# Patient Record
Sex: MALE | Race: WHITE | NOT HISPANIC OR LATINO | Employment: FULL TIME | URBAN - METROPOLITAN AREA
[De-identification: names, ages, dates, MRNs, and addresses within clinical notes are randomized per-mention and may not be internally consistent; named-entity substitution may affect disease eponyms.]

---

## 2024-01-09 ENCOUNTER — OFFICE VISIT (OUTPATIENT)
Dept: URGENT CARE | Facility: CLINIC | Age: 44
End: 2024-01-09
Payer: COMMERCIAL

## 2024-01-09 ENCOUNTER — APPOINTMENT (OUTPATIENT)
Dept: URGENT CARE | Facility: CLINIC | Age: 44
End: 2024-01-09

## 2024-01-09 VITALS
HEART RATE: 88 BPM | RESPIRATION RATE: 16 BRPM | TEMPERATURE: 96.9 F | WEIGHT: 175 LBS | DIASTOLIC BLOOD PRESSURE: 80 MMHG | HEIGHT: 70 IN | BODY MASS INDEX: 25.05 KG/M2 | SYSTOLIC BLOOD PRESSURE: 120 MMHG | OXYGEN SATURATION: 97 %

## 2024-01-09 DIAGNOSIS — J02.9 VIRAL PHARYNGITIS: ICD-10-CM

## 2024-01-09 LAB — S PYO DNA SPEC NAA+PROBE: NOT DETECTED

## 2024-01-09 PROCEDURE — 3079F DIAST BP 80-89 MM HG: CPT

## 2024-01-09 PROCEDURE — 3074F SYST BP LT 130 MM HG: CPT

## 2024-01-09 PROCEDURE — 87651 STREP A DNA AMP PROBE: CPT

## 2024-01-09 PROCEDURE — 99203 OFFICE O/P NEW LOW 30 MIN: CPT

## 2024-01-09 NOTE — PATIENT INSTRUCTIONS
You have a sore throat.  Your strep testing is pending and will be available in about 1 hour  My advise is:  Do frequent Warm salt water gargles  Alternate tylenol and ibuprofen for pain  Dry up your nasal congestion to prevent post nasal drip with antihistamines like zyrtec and a nasal decongestant like Sudafed (Do not take if you have high blood pressure)  Soft foods and cold ice pops or warm liquids, such as broth, tea with honey and lemon, or warm soup  Throat lozenges/or spray as directed    If your strep is positive, I will send in antibiotics and you can return to work/school in 24 hours starting.  Throw out your toothbrush 2-3 days after starting antibiotics    I hope you feel better soon   SCAR Roman

## 2024-01-09 NOTE — PROGRESS NOTES
"Chief Complaint   Patient presents with    Pharyngitis     X 3 days         Subjective:   HISTORY OF PRESENT ILLNESS: Jarad Hahn is a 43 y.o. male who presents for sore throat x 3 days, mild cough.  He did have URI symptoms about 2 weeks ago, most had resolved but woke 3 days ago and sore throat worsened.    Patient denies recent fevers.      Medications, Allergies, current problem list, Social and Family history reviewed today in Epic.     Objective:     /80   Pulse 88   Temp 36.1 °C (96.9 °F) (Temporal)   Resp 16   Ht 1.778 m (5' 10\")   Wt 79.4 kg (175 lb)   SpO2 97%     Physical Exam  Vitals reviewed.   Constitutional:       Appearance: Normal appearance. He is not toxic-appearing.   HENT:      Head:      Jaw: No trismus.      Right Ear: Tympanic membrane normal.      Left Ear: Tympanic membrane normal.      Nose: Rhinorrhea present.      Mouth/Throat:      Mouth: Mucous membranes are moist.      Pharynx: Uvula midline. Posterior oropharyngeal erythema present. No pharyngeal swelling, oropharyngeal exudate or uvula swelling.      Tonsils: No tonsillar exudate or tonsillar abscesses. 1+ on the right. 1+ on the left.      Comments: No muffled voice, trismus, unilateral deviation of the uvula, soft palate fullness or edema. No oral airway compromise, or drooling noted.   Eyes:      Conjunctiva/sclera: Conjunctivae normal.   Cardiovascular:      Rate and Rhythm: Normal rate and regular rhythm.      Heart sounds: Normal heart sounds.   Pulmonary:      Effort: Pulmonary effort is normal. No respiratory distress.      Breath sounds: Normal breath sounds. No decreased breath sounds or wheezing.   Musculoskeletal:      Cervical back: Full passive range of motion without pain and neck supple.   Skin:     General: Skin is warm and dry.   Neurological:      Mental Status: He is alert and oriented to person, place, and time.   Psychiatric:         Mood and Affect: Mood normal.          Assessment/Plan: "     Diagnosis and associated orders    I personally reviewed prior external notes and test results pertinent to today's visit.     1. Viral pharyngitis  POCT CEPHEID GROUP A STREP - PCR        Results for orders placed or performed in visit on 01/09/24   POCT CEPHEID GROUP A STREP - PCR   Result Value Ref Range    POC Group A Strep, PCR Not Detected Not Detected, Invalid         IMPRESSION:  Pt has stable vital signs and no red flag symptoms identified. Exam shows mild pharyngeal edema without exudates..   Informed pt that symptoms are consistent with a viral illness and are self limiting.  No antibiotics are indicated at this time.  Advised to use salt water gargles.  Ibuprofen and Tylenol for pain.  Use nasal decongestants to resolve his postnasal drip which is most likely causing his sore throat.    Differential diagnosis discussed. Pt was Educated on red flag symptoms. Pt has been Instructed to return to Urgent Care or nearest Emergency Department if symptoms fail to improve, for any change in condition, further concerns, or new concerning symptoms. Patient states understanding of the plan of care and discharge instructions.  They are discharged in stable condition.         Please note that this dictation was created using voice recognition software. I have made a reasonable attempt to correct obvious errors, but I expect that there are errors of grammar and possibly content that I did not discover before finalizing the note.    This note was electronically signed by KRISTIAN Wilson

## 2024-02-16 ENCOUNTER — HOSPITAL ENCOUNTER (OUTPATIENT)
Dept: LAB | Facility: MEDICAL CENTER | Age: 44
End: 2024-02-16
Attending: PHYSICAL MEDICINE & REHABILITATION
Payer: COMMERCIAL

## 2024-02-16 LAB
25(OH)D3 SERPL-MCNC: 35 NG/ML (ref 30–100)
ALBUMIN SERPL BCP-MCNC: 4.7 G/DL (ref 3.2–4.9)
ALBUMIN/GLOB SERPL: 1.9 G/DL
ALP SERPL-CCNC: 60 U/L (ref 30–99)
ALT SERPL-CCNC: 18 U/L (ref 2–50)
ANION GAP SERPL CALC-SCNC: 13 MMOL/L (ref 7–16)
AST SERPL-CCNC: 19 U/L (ref 12–45)
BASOPHILS # BLD AUTO: 1 % (ref 0–1.8)
BASOPHILS # BLD: 0.04 K/UL (ref 0–0.12)
BILIRUB SERPL-MCNC: 0.4 MG/DL (ref 0.1–1.5)
BUN SERPL-MCNC: 16 MG/DL (ref 8–22)
CALCIUM ALBUM COR SERPL-MCNC: 8.6 MG/DL (ref 8.5–10.5)
CALCIUM SERPL-MCNC: 9.2 MG/DL (ref 8.5–10.5)
CHLORIDE SERPL-SCNC: 103 MMOL/L (ref 96–112)
CO2 SERPL-SCNC: 22 MMOL/L (ref 20–33)
CREAT SERPL-MCNC: 1.05 MG/DL (ref 0.5–1.4)
CRP SERPL HS-MCNC: 0.4 MG/L (ref 0–3)
DHEA-S SERPL-MCNC: 191 UG/DL (ref 88.9–427)
EOSINOPHIL # BLD AUTO: 0.2 K/UL (ref 0–0.51)
EOSINOPHIL NFR BLD: 5.2 % (ref 0–6.9)
ERYTHROCYTE [DISTWIDTH] IN BLOOD BY AUTOMATED COUNT: 38.3 FL (ref 35.9–50)
EST. AVERAGE GLUCOSE BLD GHB EST-MCNC: 103 MG/DL
ESTRADIOL SERPL-MCNC: 9.9 PG/ML
FIBRINOGEN PPP-MCNC: 225 MG/DL (ref 215–460)
FOLATE SERPL-MCNC: 7.2 NG/ML
GFR SERPLBLD CREATININE-BSD FMLA CKD-EPI: 90 ML/MIN/1.73 M 2
GLOBULIN SER CALC-MCNC: 2.5 G/DL (ref 1.9–3.5)
GLUCOSE SERPL-MCNC: 102 MG/DL (ref 65–99)
HBA1C MFR BLD: 5.2 % (ref 4–5.6)
HCT VFR BLD AUTO: 44.8 % (ref 42–52)
HCYS SERPL-SCNC: 6.23 UMOL/L
HGB BLD-MCNC: 15.4 G/DL (ref 14–18)
IMM GRANULOCYTES # BLD AUTO: 0.01 K/UL (ref 0–0.11)
IMM GRANULOCYTES NFR BLD AUTO: 0.3 % (ref 0–0.9)
LYMPHOCYTES # BLD AUTO: 1.52 K/UL (ref 1–4.8)
LYMPHOCYTES NFR BLD: 39.9 % (ref 22–41)
MCH RBC QN AUTO: 30.3 PG (ref 27–33)
MCHC RBC AUTO-ENTMCNC: 34.4 G/DL (ref 32.3–36.5)
MCV RBC AUTO: 88.2 FL (ref 81.4–97.8)
MONOCYTES # BLD AUTO: 0.27 K/UL (ref 0–0.85)
MONOCYTES NFR BLD AUTO: 7.1 % (ref 0–13.4)
NEUTROPHILS # BLD AUTO: 1.77 K/UL (ref 1.82–7.42)
NEUTROPHILS NFR BLD: 46.5 % (ref 44–72)
NRBC # BLD AUTO: 0 K/UL
NRBC BLD-RTO: 0 /100 WBC (ref 0–0.2)
PLATELET # BLD AUTO: 201 K/UL (ref 164–446)
PMV BLD AUTO: 10.4 FL (ref 9–12.9)
POTASSIUM SERPL-SCNC: 4.2 MMOL/L (ref 3.6–5.5)
PROGEST SERPL-MCNC: 0.12 NG/ML
PROLACTIN SERPL-MCNC: 4.47 NG/ML (ref 2.1–17.7)
PROT SERPL-MCNC: 7.2 G/DL (ref 6–8.2)
PSA SERPL-MCNC: 0.74 NG/ML (ref 0–4)
RBC # BLD AUTO: 5.08 M/UL (ref 4.7–6.1)
SODIUM SERPL-SCNC: 138 MMOL/L (ref 135–145)
T3FREE SERPL-MCNC: 2.79 PG/ML (ref 2–4.4)
T4 FREE SERPL-MCNC: 1.19 NG/DL (ref 0.93–1.7)
TESTOST SERPL-MCNC: 520 NG/DL (ref 175–781)
THYROPEROXIDASE AB SERPL-ACNC: <9 IU/ML (ref 0–9)
TSH SERPL DL<=0.005 MIU/L-ACNC: 1.4 UIU/ML (ref 0.38–5.33)
VIT B12 SERPL-MCNC: 611 PG/ML (ref 211–911)
WBC # BLD AUTO: 3.8 K/UL (ref 4.8–10.8)

## 2024-02-16 PROCEDURE — 82642 DIHYDROTESTOSTERONE: CPT

## 2024-02-16 PROCEDURE — 84439 ASSAY OF FREE THYROXINE: CPT

## 2024-02-16 PROCEDURE — 82670 ASSAY OF TOTAL ESTRADIOL: CPT

## 2024-02-16 PROCEDURE — 86141 C-REACTIVE PROTEIN HS: CPT

## 2024-02-16 PROCEDURE — 80053 COMPREHEN METABOLIC PANEL: CPT

## 2024-02-16 PROCEDURE — 82652 VIT D 1 25-DIHYDROXY: CPT

## 2024-02-16 PROCEDURE — 83090 ASSAY OF HOMOCYSTEINE: CPT

## 2024-02-16 PROCEDURE — 83525 ASSAY OF INSULIN: CPT

## 2024-02-16 PROCEDURE — 84482 T3 REVERSE: CPT

## 2024-02-16 PROCEDURE — 84146 ASSAY OF PROLACTIN: CPT

## 2024-02-16 PROCEDURE — 85025 COMPLETE CBC W/AUTO DIFF WBC: CPT

## 2024-02-16 PROCEDURE — 84630 ASSAY OF ZINC: CPT

## 2024-02-16 PROCEDURE — 82542 COL CHROMOTOGRAPHY QUAL/QUAN: CPT

## 2024-02-16 PROCEDURE — 84681 ASSAY OF C-PEPTIDE: CPT

## 2024-02-16 PROCEDURE — 82306 VITAMIN D 25 HYDROXY: CPT

## 2024-02-16 PROCEDURE — 82627 DEHYDROEPIANDROSTERONE: CPT

## 2024-02-16 PROCEDURE — 86376 MICROSOMAL ANTIBODY EACH: CPT

## 2024-02-16 PROCEDURE — 84153 ASSAY OF PSA TOTAL: CPT

## 2024-02-16 PROCEDURE — 83516 IMMUNOASSAY NONANTIBODY: CPT

## 2024-02-16 PROCEDURE — 84481 FREE ASSAY (FT-3): CPT

## 2024-02-16 PROCEDURE — 84403 ASSAY OF TOTAL TESTOSTERONE: CPT

## 2024-02-16 PROCEDURE — 83735 ASSAY OF MAGNESIUM: CPT

## 2024-02-16 PROCEDURE — 84255 ASSAY OF SELENIUM: CPT

## 2024-02-16 PROCEDURE — 85384 FIBRINOGEN ACTIVITY: CPT

## 2024-02-16 PROCEDURE — 83036 HEMOGLOBIN GLYCOSYLATED A1C: CPT

## 2024-02-16 PROCEDURE — 84590 ASSAY OF VITAMIN A: CPT

## 2024-02-16 PROCEDURE — 82607 VITAMIN B-12: CPT

## 2024-02-16 PROCEDURE — 82746 ASSAY OF FOLIC ACID SERUM: CPT

## 2024-02-16 PROCEDURE — 84144 ASSAY OF PROGESTERONE: CPT

## 2024-02-16 PROCEDURE — 84443 ASSAY THYROID STIM HORMONE: CPT

## 2024-02-16 PROCEDURE — 36415 COLL VENOUS BLD VENIPUNCTURE: CPT

## 2024-02-17 ENCOUNTER — HOSPITAL ENCOUNTER (OUTPATIENT)
Facility: MEDICAL CENTER | Age: 44
End: 2024-02-17
Attending: PHYSICAL MEDICINE & REHABILITATION
Payer: COMMERCIAL

## 2024-02-17 PROCEDURE — 36415 COLL VENOUS BLD VENIPUNCTURE: CPT

## 2024-02-17 PROCEDURE — 83704 LIPOPROTEIN BLD QUAN PART: CPT

## 2024-02-17 PROCEDURE — 80061 LIPID PANEL: CPT

## 2024-02-18 LAB — ZINC BLD-MCNC: 586.7 UG/DL (ref 440–860)

## 2024-02-19 LAB
1,25(OH)2D3 SERPL-MCNC: 72.8 PG/ML (ref 19.9–79.3)
C PEPTIDE SERPL-MCNC: 1.1 NG/ML (ref 0.5–3.3)
INSULIN P FAST SERPL-ACNC: 4 UIU/ML (ref 3–25)
SELENIUM SERPL-MCNC: 108.1 UG/L (ref 23–190)

## 2024-02-20 LAB
ANNOTATION COMMENT IMP: NORMAL
MAGNESIUM RBC-SCNC: 6.9 MG/DL (ref 3.6–7.5)
RETINYL PALMITATE SERPL-MCNC: <0.02 MG/L (ref 0–0.1)
VIT A SERPL-MCNC: 0.71 MG/L (ref 0.3–1.2)

## 2024-02-21 LAB
21HYDROXYLASE AB SER QL: NEGATIVE
CHOLEST SERPL-MCNC: 245 MG/DL
HDL PARTICAL NO Q4363: >41 UMOL/L
HDL SIZE Q4361: 9.4 NM
HDLC SERPL-MCNC: 73 MG/DL (ref 40–59)
HLD.LARGE SERPL-SCNC: 10.7 UMOL/L
L VLDL PART NO Q4357: <1.5 NMOL/L
LDL SERPL QN: 21.6 NM
LDL SERPL-SCNC: 1425 NMOL/L
LDL SMALL SERPL-SCNC: 184 NMOL/L
LDLC SERPL CALC-MCNC: 162 MG/DL
PATHOLOGY STUDY: ABNORMAL
TRIGL SERPL-MCNC: 49 MG/DL (ref 30–149)
VLDL SIZE Q4362: 45.8 NM

## 2024-02-23 LAB — ANDROSTANOLONE SERPL-MCNC: 424.3 PG/ML (ref 106–719)

## 2024-02-25 LAB — T3REVERSE SERPL-MCNC: 11.8 NG/DL (ref 9–27)

## 2024-03-01 ENCOUNTER — HOSPITAL ENCOUNTER (OUTPATIENT)
Dept: LAB | Facility: MEDICAL CENTER | Age: 44
End: 2024-03-01
Attending: PHYSICAL MEDICINE & REHABILITATION
Payer: COMMERCIAL

## 2024-03-01 PROCEDURE — 83876 ASSAY MYELOPEROXIDASE: CPT

## 2024-03-01 PROCEDURE — 36415 COLL VENOUS BLD VENIPUNCTURE: CPT

## 2024-03-11 LAB — MISCELLANEOUS LAB RESULT MISCLAB: NORMAL

## 2024-03-15 ENCOUNTER — APPOINTMENT (OUTPATIENT)
Dept: ADMISSIONS | Facility: MEDICAL CENTER | Age: 44
End: 2024-03-15
Attending: SURGERY
Payer: COMMERCIAL

## 2024-03-20 ENCOUNTER — PRE-ADMISSION TESTING (OUTPATIENT)
Dept: ADMISSIONS | Facility: MEDICAL CENTER | Age: 44
End: 2024-03-20
Attending: SURGERY
Payer: COMMERCIAL

## 2024-03-20 RX ORDER — GENTAMICIN SULFATE 1 MG/G
1 CREAM TOPICAL 2 TIMES DAILY
COMMUNITY
Start: 2024-03-13

## 2024-03-20 RX ORDER — TRIAMCINOLONE ACETONIDE 1 MG/G
1 CREAM TOPICAL 2 TIMES DAILY
COMMUNITY
Start: 2024-02-29

## 2024-03-25 ENCOUNTER — ANESTHESIA (OUTPATIENT)
Dept: SURGERY | Facility: MEDICAL CENTER | Age: 44
End: 2024-03-25
Payer: COMMERCIAL

## 2024-03-25 ENCOUNTER — HOSPITAL ENCOUNTER (OUTPATIENT)
Facility: MEDICAL CENTER | Age: 44
End: 2024-03-25
Attending: SURGERY | Admitting: SURGERY
Payer: COMMERCIAL

## 2024-03-25 ENCOUNTER — ANESTHESIA EVENT (OUTPATIENT)
Dept: SURGERY | Facility: MEDICAL CENTER | Age: 44
End: 2024-03-25
Payer: COMMERCIAL

## 2024-03-25 VITALS
RESPIRATION RATE: 14 BRPM | OXYGEN SATURATION: 98 % | DIASTOLIC BLOOD PRESSURE: 75 MMHG | SYSTOLIC BLOOD PRESSURE: 106 MMHG | TEMPERATURE: 97 F | HEIGHT: 70 IN | WEIGHT: 183.2 LBS | HEART RATE: 61 BPM | BODY MASS INDEX: 26.23 KG/M2

## 2024-03-25 DIAGNOSIS — C43.4 MALIGNANT MELANOMA OF NECK (HCC): ICD-10-CM

## 2024-03-25 LAB — PATHOLOGY CONSULT NOTE: NORMAL

## 2024-03-25 PROCEDURE — 700111 HCHG RX REV CODE 636 W/ 250 OVERRIDE (IP): Performed by: ANESTHESIOLOGY

## 2024-03-25 PROCEDURE — 88305 TISSUE EXAM BY PATHOLOGIST: CPT

## 2024-03-25 PROCEDURE — 160046 HCHG PACU - 1ST 60 MINS PHASE II: Performed by: SURGERY

## 2024-03-25 PROCEDURE — 160027 HCHG SURGERY MINUTES - 1ST 30 MINS LEVEL 2: Performed by: SURGERY

## 2024-03-25 PROCEDURE — 160036 HCHG PACU - EA ADDL 30 MINS PHASE I: Performed by: SURGERY

## 2024-03-25 PROCEDURE — 160035 HCHG PACU - 1ST 60 MINS PHASE I: Performed by: SURGERY

## 2024-03-25 PROCEDURE — 700105 HCHG RX REV CODE 258: Performed by: ANESTHESIOLOGY

## 2024-03-25 PROCEDURE — 160009 HCHG ANES TIME/MIN: Performed by: SURGERY

## 2024-03-25 PROCEDURE — 160025 RECOVERY II MINUTES (STATS): Performed by: SURGERY

## 2024-03-25 PROCEDURE — 160002 HCHG RECOVERY MINUTES (STAT): Performed by: SURGERY

## 2024-03-25 PROCEDURE — 88307 TISSUE EXAM BY PATHOLOGIST: CPT

## 2024-03-25 PROCEDURE — 160048 HCHG OR STATISTICAL LEVEL 1-5: Performed by: SURGERY

## 2024-03-25 PROCEDURE — 700105 HCHG RX REV CODE 258: Performed by: SURGERY

## 2024-03-25 PROCEDURE — 700111 HCHG RX REV CODE 636 W/ 250 OVERRIDE (IP): Performed by: SURGERY

## 2024-03-25 RX ORDER — HYDROCODONE BITARTRATE AND ACETAMINOPHEN 5; 325 MG/1; MG/1
1 TABLET ORAL EVERY 6 HOURS PRN
Qty: 10 TABLET | Refills: 0 | Status: SHIPPED | OUTPATIENT
Start: 2024-03-25 | End: 2024-03-30

## 2024-03-25 RX ORDER — DIPHENHYDRAMINE HYDROCHLORIDE 50 MG/ML
12.5 INJECTION INTRAMUSCULAR; INTRAVENOUS
Status: DISCONTINUED | OUTPATIENT
Start: 2024-03-25 | End: 2024-03-25 | Stop reason: HOSPADM

## 2024-03-25 RX ORDER — MEPERIDINE HYDROCHLORIDE 25 MG/ML
12.5 INJECTION INTRAMUSCULAR; INTRAVENOUS; SUBCUTANEOUS
Status: DISCONTINUED | OUTPATIENT
Start: 2024-03-25 | End: 2024-03-25 | Stop reason: HOSPADM

## 2024-03-25 RX ORDER — ONDANSETRON 2 MG/ML
INJECTION INTRAMUSCULAR; INTRAVENOUS PRN
Status: DISCONTINUED | OUTPATIENT
Start: 2024-03-25 | End: 2024-03-25 | Stop reason: SURG

## 2024-03-25 RX ORDER — KETOROLAC TROMETHAMINE 15 MG/ML
INJECTION, SOLUTION INTRAMUSCULAR; INTRAVENOUS PRN
Status: DISCONTINUED | OUTPATIENT
Start: 2024-03-25 | End: 2024-03-25 | Stop reason: SURG

## 2024-03-25 RX ORDER — HYDROMORPHONE HYDROCHLORIDE 1 MG/ML
0.1 INJECTION, SOLUTION INTRAMUSCULAR; INTRAVENOUS; SUBCUTANEOUS
Status: DISCONTINUED | OUTPATIENT
Start: 2024-03-25 | End: 2024-03-25 | Stop reason: HOSPADM

## 2024-03-25 RX ORDER — HYDROMORPHONE HYDROCHLORIDE 1 MG/ML
0.4 INJECTION, SOLUTION INTRAMUSCULAR; INTRAVENOUS; SUBCUTANEOUS
Status: DISCONTINUED | OUTPATIENT
Start: 2024-03-25 | End: 2024-03-25 | Stop reason: HOSPADM

## 2024-03-25 RX ORDER — SODIUM CHLORIDE 9 MG/ML
INJECTION, SOLUTION INTRAVENOUS CONTINUOUS
Status: DISCONTINUED | OUTPATIENT
Start: 2024-03-25 | End: 2024-03-25 | Stop reason: HOSPADM

## 2024-03-25 RX ORDER — HALOPERIDOL 5 MG/ML
1 INJECTION INTRAMUSCULAR
Status: DISCONTINUED | OUTPATIENT
Start: 2024-03-25 | End: 2024-03-25 | Stop reason: HOSPADM

## 2024-03-25 RX ORDER — SODIUM CHLORIDE, SODIUM LACTATE, POTASSIUM CHLORIDE, CALCIUM CHLORIDE 600; 310; 30; 20 MG/100ML; MG/100ML; MG/100ML; MG/100ML
INJECTION, SOLUTION INTRAVENOUS CONTINUOUS
Status: DISCONTINUED | OUTPATIENT
Start: 2024-03-25 | End: 2024-03-25 | Stop reason: HOSPADM

## 2024-03-25 RX ORDER — HYDROMORPHONE HYDROCHLORIDE 1 MG/ML
0.2 INJECTION, SOLUTION INTRAMUSCULAR; INTRAVENOUS; SUBCUTANEOUS
Status: DISCONTINUED | OUTPATIENT
Start: 2024-03-25 | End: 2024-03-25 | Stop reason: HOSPADM

## 2024-03-25 RX ORDER — ONDANSETRON 2 MG/ML
4 INJECTION INTRAMUSCULAR; INTRAVENOUS
Status: DISCONTINUED | OUTPATIENT
Start: 2024-03-25 | End: 2024-03-25 | Stop reason: HOSPADM

## 2024-03-25 RX ORDER — SODIUM CHLORIDE, SODIUM LACTATE, POTASSIUM CHLORIDE, CALCIUM CHLORIDE 600; 310; 30; 20 MG/100ML; MG/100ML; MG/100ML; MG/100ML
INJECTION, SOLUTION INTRAVENOUS
Status: DISCONTINUED | OUTPATIENT
Start: 2024-03-25 | End: 2024-03-25 | Stop reason: SURG

## 2024-03-25 RX ORDER — HYDROCODONE BITARTRATE AND ACETAMINOPHEN 5; 325 MG/1; MG/1
1 TABLET ORAL EVERY 6 HOURS PRN
Status: DISCONTINUED | OUTPATIENT
Start: 2024-03-25 | End: 2024-03-25 | Stop reason: HOSPADM

## 2024-03-25 RX ORDER — DEXAMETHASONE SODIUM PHOSPHATE 4 MG/ML
INJECTION, SOLUTION INTRA-ARTICULAR; INTRALESIONAL; INTRAMUSCULAR; INTRAVENOUS; SOFT TISSUE PRN
Status: DISCONTINUED | OUTPATIENT
Start: 2024-03-25 | End: 2024-03-25 | Stop reason: SURG

## 2024-03-25 RX ORDER — BUPIVACAINE HYDROCHLORIDE 2.5 MG/ML
INJECTION, SOLUTION EPIDURAL; INFILTRATION; INTRACAUDAL
Status: DISCONTINUED | OUTPATIENT
Start: 2024-03-25 | End: 2024-03-25 | Stop reason: HOSPADM

## 2024-03-25 RX ADMIN — SODIUM CHLORIDE, POTASSIUM CHLORIDE, SODIUM LACTATE AND CALCIUM CHLORIDE: 600; 310; 30; 20 INJECTION, SOLUTION INTRAVENOUS at 08:13

## 2024-03-25 RX ADMIN — DEXAMETHASONE SODIUM PHOSPHATE 4 MG: 4 INJECTION INTRA-ARTICULAR; INTRALESIONAL; INTRAMUSCULAR; INTRAVENOUS; SOFT TISSUE at 08:30

## 2024-03-25 RX ADMIN — KETOROLAC TROMETHAMINE 15 MG: 15 INJECTION, SOLUTION INTRAMUSCULAR; INTRAVENOUS at 08:33

## 2024-03-25 RX ADMIN — SODIUM CHLORIDE, POTASSIUM CHLORIDE, SODIUM LACTATE AND CALCIUM CHLORIDE: 600; 310; 30; 20 INJECTION, SOLUTION INTRAVENOUS at 07:35

## 2024-03-25 RX ADMIN — FENTANYL CITRATE 50 MCG: 50 INJECTION, SOLUTION INTRAMUSCULAR; INTRAVENOUS at 08:30

## 2024-03-25 RX ADMIN — ONDANSETRON 4 MG: 2 INJECTION INTRAMUSCULAR; INTRAVENOUS at 08:30

## 2024-03-25 RX ADMIN — PROPOFOL 200 MG: 10 INJECTION, EMULSION INTRAVENOUS at 08:18

## 2024-03-25 RX ADMIN — FENTANYL CITRATE 50 MCG: 50 INJECTION, SOLUTION INTRAMUSCULAR; INTRAVENOUS at 08:14

## 2024-03-25 ASSESSMENT — PAIN DESCRIPTION - PAIN TYPE
TYPE: SURGICAL PAIN

## 2024-03-25 ASSESSMENT — FIBROSIS 4 INDEX: FIB4 SCORE: 0.96

## 2024-03-25 ASSESSMENT — PAIN SCALES - GENERAL: PAIN_LEVEL: 1

## 2024-03-25 NOTE — OP REPORT
DATE OF SERVICE:  03/25/2024     PREOPERATIVE DIAGNOSIS:  Thin melanoma of the right neck.     POSTOPERATIVE DIAGNOSIS:  Thin melanoma of the right neck.     PROCEDURE:  Wide excision of a right neck melanoma.     SURGEON:  Hermila Clements MD     ASSISTANT:  PAGE Aldridge     ANESTHESIA:  Laryngeal mask.     ANESTHESIOLOGIST:  Domo Israel MD     INDICATIONS:  The patient is a 43-year-old male who has a malignant melanoma   measuring 0.9 mm on his right neck.  There is no ulceration and a low mitotic   rate.  He is being brought at this time for wide excision, but does not   qualify or does not meet criteria for a sentinel node biopsy. The indications for a surgical assistant in this surgery were indicated due to complexity of the procedure. Their role included aiding in incision, retraction, holding devices  and closure of the wound.        FINDINGS:  1 cm portions were taken circumferentially around 8 mm melanoma.     DESCRIPTION OF PROCEDURE:  After the patient was identified and consented, he   was brought to the operating room and placed in supine position.  The patient   underwent laryngeal mask anesthetic clearance.  The patient's neck was prepped   and draped in sterile fashion.  The area was marked out and 1 cm margin taken   circumferentially around the lesion, taken down to the platysma.  It was   excised and sent to pathology for evaluation.  The skin flaps were then   developed and the wound was irrigated and hemostasis was obtained using   electrocautery.  It was closed with 3-0 Vicryl for subcutaneous layer and skin   was closed with a 4-0 Vicryl in subcuticular fashion.  Steri-Strip and dry   dressing placed on the wound after anesthetized with 0.25% Marcaine.  The   patient was extubated and taken to recovery room in stable condition.  All   sponge and needle counts were correct.        ______________________________  HERMILA CLEMENTS MD    [facility]      DD:  03/25/2024 08:34  DT:   03/25/2024 09:12    Job#:  [id number]

## 2024-03-25 NOTE — OR NURSING
Pt is aaox4, resting comfortably in hospital bed on room air. His respirations are equal and unlabored, he is in no acute distress. He does not complain of any pain at this time. Pt takes sips of water without difficulty or complaints of n/v. Surgical site is clean, dry and intact with scant old drainage to gauze that remains the same in PACU. Will continue to monitor.     Handoff to Bhumika ANTHONY in phase 2.    Pt's father called for update.

## 2024-03-25 NOTE — ANESTHESIA PROCEDURE NOTES
Airway    Date/Time: 3/25/2024 8:18 AM    Performed by: Domo Israel M.D.  Authorized by: Domo Israel M.D.    Location:  OR  Urgency:  Elective  Indications for Airway Management:  Anesthesia      Spontaneous Ventilation: absent    Sedation Level:  Deep  Preoxygenated: Yes    Final Airway Type:  Supraglottic airway  Final Supraglottic Airway:  Standard LMA    SGA Size:  4  Number of Attempts at Approach:  1

## 2024-03-25 NOTE — DISCHARGE INSTRUCTIONS
HOME CARE INSTRUCTIONS    ACTIVITY: Rest and take it easy for the first 24 hours.  A responsible adult is recommended to remain with you during that time.  It is normal to feel sleepy.  We encourage you to not do anything that requires balance, judgment or coordination.    FOR 24 HOURS DO NOT:  Drive, operate machinery or run household appliances.  Drink beer or alcoholic beverages.  Make important decisions or sign legal documents.    SPECIAL INSTRUCTIONS: may remove dressing on 3/27 and shower    DIET: To avoid nausea, slowly advance diet as tolerated, avoiding spicy or greasy foods for the first day.  Add more substantial food to your diet according to your physician's instructions.  Babies can be fed formula or breast milk as soon as they are hungry.  INCREASE FLUIDS AND FIBER TO AVOID CONSTIPATION.    MEDICATIONS: Resume taking daily medication.  Take prescribed pain medication with food.  If no medication is prescribed, you may take non-aspirin pain medication if needed.  PAIN MEDICATION CAN BE VERY CONSTIPATING.  Take a stool softener or laxative such as senokot, pericolace, or milk of magnesia if needed.    Prescription given for _Norco_.      A follow-up appointment should be arranged with your doctor in 3/29/24; call to schedule.    You should CALL YOUR PHYSICIAN if you develop:  Fever greater than 101 degrees F.  Pain not relieved by medication, or persistent nausea or vomiting.  Excessive bleeding (blood soaking through dressing) or unexpected drainage from the wound.  Extreme redness or swelling around the incision site, drainage of pus or foul smelling drainage.  Inability to urinate or empty your bladder within 8 hours.  Problems with breathing or chest pain.    You should call 911 if you develop problems with breathing or chest pain.  If you are unable to contact your doctor or surgical center, you should go to the nearest emergency room or urgent care center.  Physician's telephone #:  [phone]    MILD FLU-LIKE SYMPTOMS ARE NORMAL.  YOU MAY EXPERIENCE GENERALIZED MUSCLE ACHES, THROAT IRRITATION, HEADACHE AND/OR SOME NAUSEA.    If any questions arise, call your doctor.  If your doctor is not available, please feel free to call the Surgical Center at [phone].  The Center is open Monday through Friday from 7AM to 7PM.      A registered nurse may call you a few days after your surgery to see how you are doing after your procedure.    You may also receive a survey in the mail within the next two weeks and we ask that you take a few moments to complete the survey and return it to us.  Our goal is to provide you with very good care and we value your comments.     Depression / Suicide Risk    As you are discharged from this [facility] facility, it is important to learn how to keep safe from harming yourself.    Recognize the warning signs:  Abrupt changes in personality, positive or negative- including increase in energy   Giving away possessions  Change in eating patterns- significant weight changes-  positive or negative  Change in sleeping patterns- unable to sleep or sleeping all the time   Unwillingness or inability to communicate  Depression  Unusual sadness, discouragement and loneliness  Talk of wanting to die  Neglect of personal appearance   Rebelliousness- reckless behavior  Withdrawal from people/activities they love  Confusion- inability to concentrate     If you or a loved one observes any of these behaviors or has concerns about self-harm, here's what you can do:  Talk about it- your feelings and reasons for harming yourself  Remove any means that you might use to hurt yourself (examples: pills, rope, extension cords, firearm)  Get professional help from the community (Mental Health, Substance Abuse, psychological counseling)  Do not be alone:Call your Safe Contact- someone whom you trust who will be there for you.  Call your local CRISIS HOTLINE 195-5299 or 754-903-7902  Call  your local Children's Mobile Crisis Response Team [place] (712) 030-2293 or www.[employer]od[employer].TopBlip  Call the toll free National Suicide Prevention Hotlines   National Suicide Prevention Lifeline 174-893-TTFD (6112)  [place] Line Network 800-SUICIDE (793-2585)    I acknowledge receipt and understanding of these Home Care instructions.

## 2024-03-25 NOTE — ANESTHESIA TIME REPORT
Anesthesia Start and Stop Event Times       Date Time Event    3/25/2024 0800 Ready for Procedure     0813 Anesthesia Start     0844 Anesthesia Stop          Responsible Staff  03/25/24      Name Role Begin End    Domo Israel M.D. Anesth 0813 0844          Overtime Reason:  no overtime (within assigned shift)    Comments:

## 2024-03-25 NOTE — ANESTHESIA POSTPROCEDURE EVALUATION
Patient: Jarad Hahn    Procedure Summary       Date: 03/25/24 Room / Location: [facility] 09 / SURGERY [facility]    Anesthesia Start: 0813 Anesthesia Stop: 0844    Procedure: WIDE EXCISION OF MALIGNANT MELANOMA - RIGHT UPPER LATERAL NECK (Right: Neck) Diagnosis: (RIGHT NECK MELANOMA)    Surgeons: Hermila Clements M.D. Responsible Provider: Domo Israel M.D.    Anesthesia Type: general ASA Status: 2            Final Anesthesia Type: general  Last vitals  BP   Blood Pressure: 120/74    Temp   36.1 °C (96.9 °F)    Pulse   68   Resp   16    SpO2   95 %      Anesthesia Post Evaluation    Patient location during evaluation: PACU  Patient participation: complete - patient participated  Level of consciousness: awake and alert  Pain score: 1    Airway patency: patent  Anesthetic complications: no  Cardiovascular status: adequate and hemodynamically stable  Respiratory status: acceptable  Hydration status: acceptable    PONV: none          No notable events documented.

## 2024-03-25 NOTE — ANESTHESIA PREPROCEDURE EVALUATION
Case: [id number] Date/Time: 03/25/24 0815    Procedure: WIDE EXCISION OF MALIGNANT MELANOMA - RIGHT UPPER LATERAL NECK    Pre-op diagnosis: MALIGNANT MELANOMA    Location: [facility] OR 09 / SURGERY [facility]    Surgeons: Hermila Clements M.D.            Relevant Problems   No relevant active problems       Physical Exam    Anesthesia Plan    ASA 2       Plan - general       Airway plan will be LMA          Induction: intravenous    Postoperative Plan: Postoperative administration of opioids is intended.    Pertinent diagnostic labs and testing reviewed    Informed Consent:    Anesthetic plan and risks discussed with patient.    Use of blood products discussed with: patient whom consented to blood products.

## 2024-03-29 LAB — TEST NAME 95000: NORMAL

## 2024-04-23 ENCOUNTER — HOSPITAL ENCOUNTER (OUTPATIENT)
Facility: MEDICAL CENTER | Age: 44
End: 2024-04-23
Attending: REGISTERED NURSE
Payer: COMMERCIAL

## 2024-04-23 ENCOUNTER — OFFICE VISIT (OUTPATIENT)
Dept: URGENT CARE | Facility: CLINIC | Age: 44
End: 2024-04-23
Payer: COMMERCIAL

## 2024-04-23 VITALS
HEART RATE: 72 BPM | RESPIRATION RATE: 14 BRPM | BODY MASS INDEX: 27.03 KG/M2 | OXYGEN SATURATION: 99 % | DIASTOLIC BLOOD PRESSURE: 76 MMHG | HEIGHT: 70 IN | TEMPERATURE: 97.5 F | WEIGHT: 188.8 LBS | SYSTOLIC BLOOD PRESSURE: 128 MMHG

## 2024-04-23 DIAGNOSIS — T81.30XA WOUND DEHISCENCE: ICD-10-CM

## 2024-04-23 LAB — AMBIGUOUS DTTM AMBI4: NORMAL

## 2024-04-23 PROCEDURE — 3074F SYST BP LT 130 MM HG: CPT | Performed by: REGISTERED NURSE

## 2024-04-23 PROCEDURE — 99214 OFFICE O/P EST MOD 30 MIN: CPT | Performed by: REGISTERED NURSE

## 2024-04-23 PROCEDURE — 87070 CULTURE OTHR SPECIMN AEROBIC: CPT

## 2024-04-23 PROCEDURE — 3078F DIAST BP <80 MM HG: CPT | Performed by: REGISTERED NURSE

## 2024-04-23 PROCEDURE — 87205 SMEAR GRAM STAIN: CPT

## 2024-04-23 PROCEDURE — 87077 CULTURE AEROBIC IDENTIFY: CPT

## 2024-04-23 PROCEDURE — 87186 SC STD MICRODIL/AGAR DIL: CPT

## 2024-04-23 RX ORDER — CEPHALEXIN 500 MG/1
CAPSULE ORAL
COMMUNITY
Start: 2024-04-21

## 2024-04-23 RX ORDER — DOXYCYCLINE HYCLATE 100 MG
100 TABLET ORAL 2 TIMES DAILY
Qty: 14 TABLET | Refills: 0 | Status: SHIPPED | OUTPATIENT
Start: 2024-04-23 | End: 2024-04-30

## 2024-04-23 ASSESSMENT — ENCOUNTER SYMPTOMS
CHILLS: 0
SHORTNESS OF BREATH: 0
FEVER: 0
ABDOMINAL PAIN: 0
DIZZINESS: 0

## 2024-04-23 ASSESSMENT — FIBROSIS 4 INDEX: FIB4 SCORE: 0.96

## 2024-04-23 NOTE — PROGRESS NOTES
Subjective:   Jarad Hahn is a 43 y.o. male who presents for Wound Infection (Surgery x1 month ago, wound reopened/discharge/thinks is infected.)      HPI  Melanoma removed from right side of neck x 1 month ago. One week ago the incision site became red, swollen and tender. Now he is having purulent drainage. Called the surgeon who put him on cephalexin. No hx of MRSA. Currently Tolerating PO. Has follow up scheduled with surgeon tomorrow.     Review of Systems   Constitutional:  Negative for chills and fever.   Respiratory:  Negative for shortness of breath.    Cardiovascular:  Negative for chest pain.   Gastrointestinal:  Negative for abdominal pain.   Neurological:  Negative for dizziness.       No Known Allergies    Patient Active Problem List    Diagnosis Date Noted    Malignant melanoma of neck (HCC) 03/25/2024       Current Outpatient Medications Ordered in Epic   Medication Sig Dispense Refill    cephALEXin (KEFLEX) 500 MG Cap       doxycycline (VIBRAMYCIN) 100 MG Tab Take 1 Tablet by mouth 2 times a day for 7 days. 14 Tablet 0    mupirocin (BACTROBAN) 2 % Ointment Apply 1 Application topically 2 times a day for 7 days. 22 g 0    Multiple Vitamin (MULTIVITAMIN PO) Take 1 Tablet by mouth every day.      Cholecalciferol (VITAMIN D-3 PO) Take 1 Tablet by mouth every day.      triamcinolone acetonide (KENALOG) 0.1 % Cream Apply 1 Application topically 2 times a day. Apply to groin (Patient not taking: Reported on 4/23/2024)      gentamicin (GARAMYCIN) 0.1 % cream Apply 1 Dose topically 2 times a day. Apply to groin (Patient not taking: Reported on 4/23/2024)      Omega-3 Fatty Acids (FISH OIL PO) Take 1 Tablet by mouth every day. (Patient not taking: Reported on 4/23/2024)       No current [facility]-ordered facility-administered medications on file.       Past Surgical History:   Procedure Laterality Date    WIDE EXCISION, LESION, HEAD AND NECK REGION Right 3/25/2024    Procedure: WIDE EXCISION OF  "MALIGNANT MELANOMA - RIGHT UPPER LATERAL NECK;  Surgeon: Hermila Clements M.D.;  Location: SURGERY [facility];  Service: General    INGUINAL HERNIA REPAIR Left     KNEE ARTHROSCOPY      right x2, left x1    TONSILLECTOMY         Social History     Tobacco Use    Smoking status: Former     Current packs/day: 0.00     Average packs/day: 0.5 packs/day for 2.7 years (1.4 ttl pk-yrs)     Types: Cigarettes     Start date:      Quit date: 2023     Years since quittin.5    Smokeless tobacco: Former     Types: Snuff    Tobacco comments:     Currently uses a nicotine pouch   Vaping Use    Vaping Use: Never used   Substance Use Topics    Alcohol use: Not Currently    Drug use: Never       family history is not on file.     Problem list, medications, and allergies reviewed by myself today in Epic.     Objective:   /76   Pulse 72   Temp 36.4 °C (97.5 °F) (Temporal)   Resp 14   Ht 1.778 m (5' 10\")   Wt 85.6 kg (188 lb 12.8 oz)   SpO2 99%   BMI 27.09 kg/m²     Physical Exam  Vitals and nursing note reviewed.   Constitutional:       Appearance: He is not ill-appearing or toxic-appearing.   HENT:      Head: Normocephalic.   Eyes:      Pupils: Pupils are equal, round, and reactive to light.   Neck:        Comments: Incision right lateral neck, 5 cm in length, dehiscence centrally with scant purulent drainage, no fluctuance.  Erythema.  Tenderness.  Cardiovascular:      Rate and Rhythm: Normal rate.   Pulmonary:      Effort: Pulmonary effort is normal.   Neurological:      General: No focal deficit present.      Mental Status: He is alert.   Psychiatric:         Mood and Affect: Mood normal.         Assessment/Plan:     I personally reviewed prior external notes and test results pertinent to today's visit as well as additional imaging and testing completed in clinic today.     1. Wound dehiscence  doxycycline (VIBRAMYCIN) 100 MG Tab    CULTURE WOUND W/ GRAM STAIN    mupirocin (BACTROBAN) 2 % Ointment    "     Patient had melanoma surgically removed from lateral neck 1 month ago, was healing up nicely until about a week ago when developed redness pain and warmth centrally in the incision which then opened with purulent drainage.  Surgeon did place him on cephalexin which has improved symptoms but still some scant drainage, erythema and tenderness.  Has follow-up with surgeon tomorrow.  Given the purulent drainage we will also add doxycycline.  Did complete a wound culture although this may be skewed since he is currently on antibiotics.  Discussed wound care we will also send mupirocin to apply topically.  Reviewed signs and symptoms that require immediate attention    Shared decision-making was utilized with patient for treatment plan. Differential Diagnosis, natural history, and supportive care discussed.     Medication discussed included indication for use and the potential benefits and side effects. Education was provided regarding the aforementioned assessments. All of the patient's questions were answered to their satisfaction at the time of discharge. Patient was encouraged to monitor symptoms closely. Those signs and symptoms which would warrant concern and mandate seeking a higher level of service through the emergency department discussed at length. Patient stated agreement and understanding of this plan of care.     Please note that this dictation was created using voice recognition software. I have made every reasonable attempt to correct obvious errors, but I expect that there are errors of grammar and possibly content that I did not discover before finalizing the note.    This note was electronically signed by KRISTIAN Mancilla

## 2024-04-24 LAB
GRAM STN SPEC: NORMAL
SIGNIFICANT IND 70042: NORMAL
SITE SITE: NORMAL
SOURCE SOURCE: NORMAL

## 2024-04-27 LAB
BACTERIA WND AEROBE CULT: ABNORMAL
BACTERIA WND AEROBE CULT: ABNORMAL
GRAM STN SPEC: ABNORMAL
SIGNIFICANT IND 70042: ABNORMAL
SITE SITE: ABNORMAL
SOURCE SOURCE: ABNORMAL

## 2024-06-01 ENCOUNTER — APPOINTMENT (OUTPATIENT)
Dept: URGENT CARE | Facility: CLINIC | Age: 44
End: 2024-06-01
Payer: COMMERCIAL

## 2024-06-01 ENCOUNTER — OFFICE VISIT (OUTPATIENT)
Dept: URGENT CARE | Facility: CLINIC | Age: 44
End: 2024-06-01
Payer: COMMERCIAL

## 2024-06-01 VITALS
DIASTOLIC BLOOD PRESSURE: 72 MMHG | BODY MASS INDEX: 25.77 KG/M2 | WEIGHT: 180 LBS | HEIGHT: 70 IN | OXYGEN SATURATION: 97 % | SYSTOLIC BLOOD PRESSURE: 102 MMHG | TEMPERATURE: 97.4 F | RESPIRATION RATE: 18 BRPM | HEART RATE: 95 BPM

## 2024-06-01 DIAGNOSIS — L50.9 FULL BODY HIVES: ICD-10-CM

## 2024-06-01 DIAGNOSIS — B34.9 VIRAL SYNDROME: ICD-10-CM

## 2024-06-01 PROCEDURE — 99214 OFFICE O/P EST MOD 30 MIN: CPT

## 2024-06-01 PROCEDURE — 3074F SYST BP LT 130 MM HG: CPT

## 2024-06-01 PROCEDURE — 3078F DIAST BP <80 MM HG: CPT

## 2024-06-01 PROCEDURE — 1125F AMNT PAIN NOTED PAIN PRSNT: CPT

## 2024-06-01 RX ORDER — CETIRIZINE HYDROCHLORIDE 10 MG/1
10 TABLET ORAL DAILY
Qty: 30 TABLET | Refills: 0 | Status: SHIPPED | OUTPATIENT
Start: 2024-06-01

## 2024-06-01 RX ORDER — METHYLPREDNISOLONE 4 MG/1
TABLET ORAL
Qty: 21 TABLET | Refills: 0 | Status: SHIPPED | OUTPATIENT
Start: 2024-06-01

## 2024-06-01 ASSESSMENT — FIBROSIS 4 INDEX: FIB4 SCORE: 0.96

## 2024-06-01 ASSESSMENT — ENCOUNTER SYMPTOMS
VOMITING: 0
MYALGIAS: 1
HEADACHES: 1
SORE THROAT: 0
FEVER: 0
NAUSEA: 0

## 2024-06-01 ASSESSMENT — PAIN SCALES - GENERAL: PAINLEVEL: 4=SLIGHT-MODERATE PAIN

## 2024-06-01 NOTE — PROGRESS NOTES
Subjective:     CHIEF COMPLAINT  Chief Complaint   Patient presents with    Rash     Rash all over body since Wed w/resp symptons       HPI  Jarad Hahn is a very pleasant 43 y.o. male who presents with a hive-like rash for the past 4 days.  He reports that the rash originally appeared on his biceps and has been progressively spreading.  The rash has been extremely itchy.  No one else in his household has the same rash.  He has tried taking Benadryl and using calamine lotion without resolution in symptoms.  He has also developed a headache, body aches, and chest congestion shortly after the development of the rash.  Prior to the onset of the rash, he ate an apple and does report an allergy to apples as a child which he had outgrown as an adult.  He has not had any apples for several months prior to a few days ago.  Additionally, he started taking an iodine supplement due to personal concern of iodine deficiency.  He denies an allergy to shellfish or contrast.  He has discontinued the iodine supplement at this time.  He has not had any fevers.  He has had chickenpox and shingles in the past.    REVIEW OF SYSTEMS  Review of Systems   Constitutional:  Negative for fever.   HENT:  Negative for sore throat.    Gastrointestinal:  Negative for nausea and vomiting.   Musculoskeletal:  Positive for myalgias.   Skin:  Positive for itching and rash.   Neurological:  Positive for headaches.       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    ALLERGIES  No Known Allergies    CURRENT MEDICATIONS  Home Medications       Reviewed by Jared Boudreaux'teresa (Medical Assistant) on 06/01/24 at 0911  Med List Status: <None>     Medication Last Dose  "Status   cephALEXin (KEFLEX) 500 MG Cap  Active   Cholecalciferol (VITAMIN D-3 PO) Taking Active   gentamicin (GARAMYCIN) 0.1 % cream  Active   Multiple Vitamin (MULTIVITAMIN PO) Taking Active   Omega-3 Fatty Acids (FISH OIL PO) Taking Active   triamcinolone acetonide (KENALOG) 0.1 % Cream  Active                    SOCIAL HISTORY  Social History     Tobacco Use    Smoking status: Former     Current packs/day: 0.00     Average packs/day: 0.5 packs/day for 2.7 years (1.4 ttl pk-yrs)     Types: Cigarettes     Start date:      Quit date: 2023     Years since quittin.6    Smokeless tobacco: Former     Types: Snuff    Tobacco comments:     Currently uses a nicotine pouch   Vaping Use    Vaping status: Never Used   Substance and Sexual Activity    Alcohol use: Not Currently    Drug use: Never    Sexual activity: Not on file       FAMILY HISTORY  No family history on file.       Objective:     VITAL SIGNS: /72 (BP Location: Left arm, Patient Position: Sitting, BP Cuff Size: Adult)   Pulse 95   Temp 36.3 °C (97.4 °F) (Temporal)   Resp 18   Ht 1.778 m (5' 10\")   Wt 81.6 kg (180 lb)   SpO2 97%   BMI 25.83 kg/m²     PHYSICAL EXAM  Physical Exam  Vitals reviewed.   Constitutional:       General: He is not in acute distress.     Appearance: Normal appearance. He is normal weight. He is not ill-appearing, toxic-appearing or diaphoretic.   HENT:      Head: Normocephalic and atraumatic.      Nose: Nose normal.      Mouth/Throat:      Mouth: Mucous membranes are moist.      Pharynx: No oropharyngeal exudate or posterior oropharyngeal erythema.      Comments: Uvula midline.  No oral/labial swelling.  Airway is patent.  Eyes:      Conjunctiva/sclera: Conjunctivae normal.   Cardiovascular:      Rate and Rhythm: Normal rate and regular rhythm.      Heart sounds: Normal heart sounds.   Pulmonary:      Effort: Pulmonary effort is normal. No respiratory distress.      Breath sounds: Normal breath sounds. No " stridor. No wheezing, rhonchi or rales.   Skin:     General: Skin is warm and dry.      Findings: Rash present. No petechiae. Rash is papular, urticarial and vesicular. Rash is not crusting.             Comments: Urticarial rash present on bilateral biceps with scattered papules and rare vesicles.  Rash blanches and less than 2 seconds.  Rash has an erythematous appearance.   Neurological:      General: No focal deficit present.      Mental Status: He is alert.   Psychiatric:         Mood and Affect: Mood normal.         Assessment/Plan:     1. Full body hives  - methylPREDNISolone (MEDROL DOSEPAK) 4 MG Tablet Therapy Pack; Follow schedule on package instructions.  Dispense: 21 Tablet; Refill: 0  - cetirizine (ZYRTEC) 10 MG Tab; Take 1 Tablet by mouth every day.  Dispense: 30 Tablet; Refill: 0    2. Viral syndrome  -May continue use of calamine lotion  -Discontinue iron supplement and follow-up with primary care provider  -Return to clinic/emergency department if symptoms worsen or fail to resolve  -Tylenol over-the-counter as needed for bodyaches    MDM/Comments:  I have prepared for this visit by personally reviewing the patient's prevous medical records, vitals, and labs including: most recent CMP and A1C. Patient has stable vital signs and is non-toxic appearing.  Patient provided a Medrol Dosepak and cetirizine for full body hives.  Discussed that rash may also be secondary to viral illness.  No evidence of strep pharyngitis at this time.  Patient's lungs are clear to auscultation bilaterally with pulse ox of 97% on room air.  Discussed supportive care with hydration, rest, Tylenol as needed and monitoring symptoms closely. Patient demonstrated understanding of treatment plan at this time and will RTC if symptoms worsen or fail to resolve.       Differential diagnosis, natural history, supportive care, and indications for immediate follow-up discussed. All questions answered. Patient agrees with the plan of  care.    Follow-up as needed if symptoms worsen or fail to improve to PCP, Urgent care or Emergency Room.    I have personally reviewed prior external notes and test results pertinent to today's visit.  I have independently reviewed and interpreted all diagnostics ordered during this urgent care acute visit.   Discussed management options (risks,benefits, and alternatives to treatment). Pt expresses understanding and the treatment plan was agreed upon. Questions were encouraged and answered to pt's satisfaction.    Please note that this dictation was created using voice recognition software. I have made a reasonable attempt to correct obvious errors, but I expect that there are errors of grammar and possibly content that I did not discover before finalizing the note.

## 2024-06-16 ENCOUNTER — SUPERVISING PHYSICIAN REVIEW (OUTPATIENT)
Dept: URGENT CARE | Facility: CLINIC | Age: 44
End: 2024-06-16
Payer: COMMERCIAL

## (undated) DEVICE — SET EXTENSION WITH 2 PORTS (48EA/CA) ***PART #2C8610 IS A SUBSTITUTE*****

## (undated) DEVICE — CANISTER SUCTION 3000ML MECHANICAL FILTER AUTO SHUTOFF MEDI-VAC NONSTERILE LF DISP  (40EA/CA)

## (undated) DEVICE — PACK MINOR BASIN - (2EA/CA)

## (undated) DEVICE — SHEET PEDIATRIC LAPAROTOMY - (10/CA)

## (undated) DEVICE — SODIUM CHL IRRIGATION 0.9% 1000ML (12EA/CA)

## (undated) DEVICE — ELECTRODE DUAL RETURN W/ CORD - (50/PK)

## (undated) DEVICE — SENSOR OXIMETER ADULT SPO2 RD SET (20EA/BX)

## (undated) DEVICE — GLOVE BIOGEL INDICATOR SZ 6.5 SURGICAL PF LTX - (50PR/BX 4BX/CA)

## (undated) DEVICE — SLEEVE VASO CALF MED - (10PR/CA)

## (undated) DEVICE — BOVIE NEEDLE TIP 3CM COLORADO

## (undated) DEVICE — GLOVE BIOGEL SZ 6.5 SURGICAL PF LTX (50PR/BX 4BX/CA)

## (undated) DEVICE — LACTATED RINGERS INJ 1000 ML - (14EA/CA 60CA/PF)

## (undated) DEVICE — TUBING CLEARLINK DUO-VENT - C-FLO (48EA/CA)

## (undated) DEVICE — SUTURE 3-0 VICRYL PLUS SH - 8X 18 INCH (12/BX)

## (undated) DEVICE — GOWN WARMING STANDARD FLEX - (30/CA)

## (undated) DEVICE — SUCTION INSTRUMENT YANKAUER BULBOUS TIP W/O VENT (50EA/CA)

## (undated) DEVICE — COVER LIGHT HANDLE ALC PLUS DISP (18EA/BX)

## (undated) DEVICE — SET LEADWIRE 5 LEAD BEDSIDE DISPOSABLE ECG (1SET OF 5/EA)